# Patient Record
Sex: FEMALE
[De-identification: names, ages, dates, MRNs, and addresses within clinical notes are randomized per-mention and may not be internally consistent; named-entity substitution may affect disease eponyms.]

---

## 2022-06-20 ENCOUNTER — HOSPITAL ENCOUNTER (OUTPATIENT)
Dept: HOSPITAL 5 - ED | Age: 87
Setting detail: OBSERVATION
LOS: 1 days | Discharge: HOME | End: 2022-06-21
Attending: STUDENT IN AN ORGANIZED HEALTH CARE EDUCATION/TRAINING PROGRAM | Admitting: INTERNAL MEDICINE
Payer: MEDICARE

## 2022-06-20 DIAGNOSIS — Z79.4: ICD-10-CM

## 2022-06-20 DIAGNOSIS — E78.2: ICD-10-CM

## 2022-06-20 DIAGNOSIS — F01.50: ICD-10-CM

## 2022-06-20 DIAGNOSIS — Z79.899: ICD-10-CM

## 2022-06-20 DIAGNOSIS — I48.11: ICD-10-CM

## 2022-06-20 DIAGNOSIS — I67.2: ICD-10-CM

## 2022-06-20 DIAGNOSIS — Z90.710: ICD-10-CM

## 2022-06-20 DIAGNOSIS — E66.2: ICD-10-CM

## 2022-06-20 DIAGNOSIS — Z95.1: ICD-10-CM

## 2022-06-20 DIAGNOSIS — Z86.73: ICD-10-CM

## 2022-06-20 DIAGNOSIS — I11.0: ICD-10-CM

## 2022-06-20 DIAGNOSIS — Z87.891: ICD-10-CM

## 2022-06-20 DIAGNOSIS — Z79.82: ICD-10-CM

## 2022-06-20 DIAGNOSIS — Z98.890: ICD-10-CM

## 2022-06-20 DIAGNOSIS — I50.33: ICD-10-CM

## 2022-06-20 DIAGNOSIS — E11.9: ICD-10-CM

## 2022-06-20 DIAGNOSIS — Z79.84: ICD-10-CM

## 2022-06-20 DIAGNOSIS — E88.81: ICD-10-CM

## 2022-06-20 DIAGNOSIS — I20.0: Primary | ICD-10-CM

## 2022-06-20 LAB
APTT BLD: 31 SEC. (ref 24.2–36.6)
APTT BLD: 49.5 SEC. (ref 24.2–36.6)
BASOPHILS # (AUTO): 0 K/MM3 (ref 0–0.1)
BASOPHILS NFR BLD AUTO: 0.5 % (ref 0–1.8)
BUN SERPL-MCNC: 13 MG/DL (ref 7–17)
BUN/CREAT SERPL: 16 %
CALCIUM SERPL-MCNC: 9.5 MG/DL (ref 8.4–10.2)
EOSINOPHIL # BLD AUTO: 0.2 K/MM3 (ref 0–0.4)
EOSINOPHIL NFR BLD AUTO: 2.6 % (ref 0–4.3)
HCT VFR BLD CALC: 42.4 % (ref 30.3–42.9)
HCT VFR BLD CALC: 48.2 % (ref 30.3–42.9)
HEMOLYSIS INDEX: 21
HGB BLD-MCNC: 14.1 GM/DL (ref 10.1–14.3)
HGB BLD-MCNC: 15.5 GM/DL (ref 10.1–14.3)
INR PPP: 1.01 (ref 0.87–1.13)
INR PPP: 1.41 (ref 0.87–1.13)
LYMPHOCYTES # BLD AUTO: 1.3 K/MM3 (ref 1.2–5.4)
LYMPHOCYTES NFR BLD AUTO: 21.4 % (ref 13.4–35)
MCHC RBC AUTO-ENTMCNC: 32 % (ref 30–34)
MCHC RBC AUTO-ENTMCNC: 33 % (ref 30–34)
MCV RBC AUTO: 88 FL (ref 79–97)
MCV RBC AUTO: 90 FL (ref 79–97)
MONOCYTES # (AUTO): 0.5 K/MM3 (ref 0–0.8)
MONOCYTES % (AUTO): 8.5 % (ref 0–7.3)
PLATELET # BLD: 198 K/MM3 (ref 140–440)
PLATELET # BLD: 199 K/MM3 (ref 140–440)
RBC # BLD AUTO: 4.83 M/MM3 (ref 3.65–5.03)
RBC # BLD AUTO: 5.38 M/MM3 (ref 3.65–5.03)
T4 FREE SERPL-MCNC: 1.4 NG/DL (ref 0.76–1.46)

## 2022-06-20 PROCEDURE — 96374 THER/PROPH/DIAG INJ IV PUSH: CPT

## 2022-06-20 PROCEDURE — 85379 FIBRIN DEGRADATION QUANT: CPT

## 2022-06-20 PROCEDURE — 82550 ASSAY OF CK (CPK): CPT

## 2022-06-20 PROCEDURE — 82805 BLOOD GASES W/O2 SATURATION: CPT

## 2022-06-20 PROCEDURE — 71045 X-RAY EXAM CHEST 1 VIEW: CPT

## 2022-06-20 PROCEDURE — 85027 COMPLETE CBC AUTOMATED: CPT

## 2022-06-20 PROCEDURE — 84443 ASSAY THYROID STIM HORMONE: CPT

## 2022-06-20 PROCEDURE — 84484 ASSAY OF TROPONIN QUANT: CPT

## 2022-06-20 PROCEDURE — 96376 TX/PRO/DX INJ SAME DRUG ADON: CPT

## 2022-06-20 PROCEDURE — 83735 ASSAY OF MAGNESIUM: CPT

## 2022-06-20 PROCEDURE — G0378 HOSPITAL OBSERVATION PER HR: HCPCS

## 2022-06-20 PROCEDURE — 80048 BASIC METABOLIC PNL TOTAL CA: CPT

## 2022-06-20 PROCEDURE — 93005 ELECTROCARDIOGRAM TRACING: CPT

## 2022-06-20 PROCEDURE — 83880 ASSAY OF NATRIURETIC PEPTIDE: CPT

## 2022-06-20 PROCEDURE — 82553 CREATINE MB FRACTION: CPT

## 2022-06-20 PROCEDURE — 85610 PROTHROMBIN TIME: CPT

## 2022-06-20 PROCEDURE — 85025 COMPLETE CBC W/AUTO DIFF WBC: CPT

## 2022-06-20 PROCEDURE — 84439 ASSAY OF FREE THYROXINE: CPT

## 2022-06-20 PROCEDURE — 82565 ASSAY OF CREATININE: CPT

## 2022-06-20 PROCEDURE — 36415 COLL VENOUS BLD VENIPUNCTURE: CPT

## 2022-06-20 PROCEDURE — 99285 EMERGENCY DEPT VISIT HI MDM: CPT

## 2022-06-20 PROCEDURE — 85730 THROMBOPLASTIN TIME PARTIAL: CPT

## 2022-06-20 PROCEDURE — 93970 EXTREMITY STUDY: CPT

## 2022-06-20 RX ADMIN — METOPROLOL TARTRATE SCH MG: 50 TABLET, FILM COATED ORAL at 22:57

## 2022-06-20 RX ADMIN — FAMOTIDINE SCH MG: 10 TABLET ORAL at 22:57

## 2022-06-20 RX ADMIN — FUROSEMIDE SCH MG: 10 INJECTION, SOLUTION INTRAVENOUS at 22:57

## 2022-06-20 RX ADMIN — APIXABAN SCH MG: 5 TABLET, FILM COATED ORAL at 22:57

## 2022-06-20 RX ADMIN — FUROSEMIDE SCH MG: 10 INJECTION, SOLUTION INTRAVENOUS at 18:14

## 2022-06-20 RX ADMIN — Medication SCH ML: at 23:01

## 2022-06-20 RX ADMIN — FUROSEMIDE ONE MG: 10 INJECTION, SOLUTION INTRAVENOUS at 13:48

## 2022-06-20 NOTE — HISTORY AND PHYSICAL REPORT
History of Present Illness


Chief complaint: 





My chest hurts and it is hard to breathe


History of present illness: 


85 YO Female with CAD S/P Stent Placement, Atrial Fib on Therapeutic 

Anticoagulation with Eliquis, HTN, HLD, DM, CVA, Obesity Hypoventilation 

Syndrome, Metabolic Syndrome, Vascular Dementia, Cerebral Atherosclerosis 

presents to ED for evaluation.  Patient reports "my chest hurts and is hard to 

breathe".  Patient states that she experienced a sudden onset of chest pain and 

shortness of breath that began at 0400 hrs. that awoke her from sleep.  Patient 

states that that the chest pain and shortness of breath was initially 

intermittent but has become more constant.  Patient states that pain is 3-5/10, 

intermittent, substernal, worsened with exertion, relieved with rest.  Patient 

acknowledges decreased exercise tolerance, dyspnea on exertion, dyspnea at rest,

orthopnea, lower extremity edema, as well as paroxysmal nocturnal dyspnea.  

Patient acknowledges 8 pound weight gain over the past 2 weeks.  EMS was 

notified and upon arrival the patient was found to be in distress and 

subsequently transported to [facility] for further care and evaluation of the 

aforementioned symptoms.  The patient was seen and evaluated in the emergency 

department.  All lab and imaging studies reviewed.  Patient found to have 

angina, as well as clinical symptoms consistent with new onset CHF.  Patient 

admitted to telemetry and initiated on ACS protocol as well as CHF protocol.  

Patient denies fever, chills, palpitation, productive cough, skin rash, recent 

contact, known exposure to COVID-19.  Prior admission on 1/4/2018 reviewed.  All

medication listed at time of admission has been reconciled.  Advanced care 

planning conducted in ED.  Cardiology team consulted in ED.





Past History


Past Medical History: atrial fib, CAD, hypertension, hyperlipidemia, stroke


Past Surgical History: hysterectomy, Other (Knee surgery, back surgery, facial)


Social history: , smoking (Former smoker)


Family history: CAD





Medications and Allergies


                                    Allergies











Allergy/AdvReac Type Severity Reaction Status Date / Time


 


codeine Allergy  Rash Verified 06/20/22 11:03


 


ibuprofen [From Motrin] Allergy  Rash Verified 06/20/22 11:03


 


lisinopril Allergy  Rash Verified 06/20/22 11:03


 


meperidine HCl [From Demerol] Allergy  Rash Verified 06/20/22 11:03


 


oxycodone HCl [From Percodan] Allergy  Rash Verified 06/20/22 11:03


 


oxycodone terephthalate Allergy  Rash Verified 06/20/22 11:03





[From Percodan]     


 


Penicillins Allergy  Rash Verified 06/20/22 11:03


 


prednisone Allergy  Rash Verified 06/20/22 11:03


 


acetaminophen AdvReac  Rash Verified 06/20/22 11:03





[From Darvocet-N]     


 


diazepam [From Valium] AdvReac  Rash Verified 06/20/22 11:03


 


propoxyphene napsylate AdvReac  Rash Verified 06/20/22 11:03





[From Darvocet-N]     


 


thread Allergy  Unknown Uncoded 06/20/22 11:03











                                Home Medications











 Medication  Instructions  Recorded  Confirmed  Last Taken  Type


 


ISOSORBIDE MONOnitrate [Imdur ER] 60 mg PO QDAY 01/04/18 01/04/18 01/03/18 

History


 


Insulin NPH/Regular [NovoLIN 70/30] 12 unit SUB-Q BIDDIAB  units 01/07/18  

Unknown Rx


 


Apixaban [Eliquis] 5 mg PO BID 06/20/22 06/20/22 06/19/22 History


 


Humalog 25 unit SUB-Q DAILY 06/20/22 06/20/22 Unknown History


 


Lasix 40 mg PO DAILY 06/20/22 06/20/22 06/19/22 History


 


Metoprolol 1,000 mg PO BID 06/20/22 06/20/22 06/19/22 History


 


metFORMIN 1,000 mg PO BID 06/20/22 06/20/22 06/19/22 History











Active Meds: 


Active Medications





Acetaminophen (Acetaminophen 325 Mg Tab)  650 mg PO Q4H PRN


   PRN Reason: Pain MILD(1-3)/Fever >100.5/HA


Albuterol (Albuterol 2.5 Mg/3 Ml Nebu)  2.5 mg IH Q4HRT PRN


   PRN Reason: Shortness Of Breath


Apixaban (Apixaban 5 Mg Tab)  5 mg PO Q12HR CINTHIA; Protocol


Aspirin (Aspirin 81 Mg Tab Chew)  81 mg PO DAILY CINTHIA


Atorvastatin Calcium (Atorvastatin 10 Mg Tab)  10 mg PO QHS CINTHIA


Famotidine (Famotidine 10 Mg Tab)  10 mg PO BID CINTHIA


Furosemide (Furosemide 40 Mg/4 Ml Inj)  40 mg IV BID CINTHIA


Metoprolol Tartrate (Metoprolol Tartrate 50 Mg Tab)  12.5 mg PO BID CINTHIA


Morphine Sulfate (Morphine 4 Mg/1 Ml Inj)  2 mg IV Q8H PRN


   PRN Reason: Pain , Severe (7-10)


Ondansetron HCl (Ondansetron 4 Mg/2 Ml Inj)  4 mg IV Q8H PRN


   PRN Reason: Nausea And Vomiting


Oxycodone/Acetaminophen (Oxycodone /Acetaminophen 5-325mg Tab)  1 tab PO Q6H PRN


   PRN Reason: Pain, Moderate (4-6)


Sodium Chloride (Sodium Chloride 0.9% 10 Ml Flush Syringe)  10 ml IV BID CINTHIA


Sodium Chloride (Sodium Chloride 0.9% 10 Ml Flush Syringe)  10 ml IV PRN PRN


   PRN Reason: LINE FLUSH











Review of Systems


Constitutional: weight gain, no weight loss, no fever, no chills


Ears, nose, mouth and throat: no ear pain, no tinnitis, no decreased hearing, no

 nasal congestion


Breasts: no change in shape, no swelling, no mass


Cardiovascular: chest pain, orthopnea, shortness of breath, dyspnea on exertion,

 paroxysmal nocturnal dyspnea, high blood pressure, leg edema, decreased 

exercise tolerance


Respiratory: no cough, no cough with sputum, no hemoptysis


Gastrointestinal: nausea, no abdominal pain, no vomiting, no diarrhea, no 

constipation


Genitourinary Female: no pelvic pain, no flank pain, no dysuria, no urinary 

frequency, no urgency


Rectal: no pain, no incontinence, no bleeding


Musculoskeletal: no neck stiffness, no neck pain, no shooting arm pain


Integumentary: no rash, no pruritis, no redness, no sores, no wounds


Neurological: no head injury, no transient paralysis, no weakness, no 

parathesias, no tingling, no syncope


Psychiatric: no anxiety, no memory loss, no insomnia, no hypersomnia


Endocrine: no cold intolerance, no polyphagia, no excessive thirst, no recent 

glucocorticoid use


Hematologic/Lymphatic: no easy bruising, no easy bleeding, no lymphadenopathy


Allergic/Immunologic: no urticaria, no allergic rhinitis, no persistent 

infections





Exam





- Constitutional


Vitals: 


                                        











Temp Pulse Resp BP Pulse Ox


 


 97.6 F   78   16   153/75   100 


 


 06/20/22 11:26  06/20/22 16:41  06/20/22 16:41  06/20/22 16:41  06/20/22 16:41











General appearance: Present: mild distress, obese





- EENT


Eyes: Present: PERRL


ENT: hearing intact, clear oral mucosa





- Neck


Neck: Present: supple, normal ROM, masses or JVD





- Respiratory


Respiratory effort: normal


Respiratory: bilateral: diminished, rales





- Cardiovascular


Rhythm: irregularly irregular


Heart Sounds: Present: S1 & S2.  Absent: rub, click





- Extremities


Extremities: pulses symmetrical


Extremity abnormal: edema


Peripheral Pulses: within normal limits





- Abdominal


General gastrointestinal: Present: soft, non-tender, non-distended, normal bowel

 sounds


Female genitourinary: Present: normal





- Integumentary


Integumentary: Present: clear, warm, dry





- Musculoskeletal


Musculoskeletal: gait normal, strength equal bilaterally





- Psychiatric


Psychiatric: appropriate mood/affect, intact judgment & insight





- Neurologic


Neurologic: CNII-XII intact, moves all extremities





HEART Score





- HEART Score


EKG: Non-specific


Age: > 65


Risk factors: > 3 risk factors or hx of atherosclerotic disease


Troponin: 


                                        











Troponin T  < 0.010 ng/mL (0.00-0.029)   06/20/22  12:46    











Troponin: < normal limit





Results





- Labs


CBC & Chem 7: 


                                 06/20/22 17:08





                                 06/20/22 17:08


Labs: 


                              Abnormal lab results











  06/20/22 06/20/22 06/20/22 Range/Units





  12:46 12:46 12:46 


 


Mono % (Auto)  8.5 H    (0.0-7.3)  %


 


PT   18.9 H   (12.2-14.9)  Sec.


 


INR   1.41 H   (0.87-1.13)  


 


APTT   49.5 H   (24.2-36.6)  Sec.


 


VBG pH    7.317 L  (7.320-7.420)  














Assessment and Plan





- Patient Problems


(1) Angina at rest


Current Visit: Yes   Status: Acute   


Plan to address problem: 


ACS protocol: Serial cardiac enzymes, EKG, telemetry monitoring, cardiology team

 consulted in ED, further care and testing as per cardiology team.  Morphine, 

submental oxygen, nitro, aspirin.








(2) CHF (congestive heart failure)


Current Visit: Yes   Status: Acute   


Qualifiers: 


   Heart failure type: systolic   Heart failure chronicity: acute   Qualified 

Code(s): I50.21 - Acute systolic (congestive) heart failure   


Plan to address problem: 


CHF protocol: Strict I's/O, monitor urine output every shift, daily weight, 

afterload reduction, diuresis with Lasix, blood pressure control, echocardiogram

 ordered and pending at time of admission, thyroid panel, magnesium level.  

Cardiology team consulted.








(3) Hypertension


Current Visit: Yes   Status: Acute   


Qualifiers: 


   Hypertension type: primary hypertension   Qualified Code(s): I10 - Essential 

(primary) hypertension   


Plan to address problem: 


Monitor blood pressure every shift, continue medical management.








(4) Hyperlipidemia


Current Visit: Yes   Status: Acute   


Qualifiers: 


   Hyperlipidemia type: mixed hyperlipidemia   Qualified Code(s): E78.2 - Mixed 

hyperlipidemia   


Plan to address problem: 


Low-cholesterol diet, statin therapy, supportive care.








(5) A-fib


Current Visit: Yes   Status: Acute   


Qualifiers: 


   Atrial fibrillation type: longstanding persistent   Qualified Code(s): I48.11

 - Longstanding persistent atrial fibrillation   


Plan to address problem: 


Rate controlled, continue therapeutic anticoagulation, thyroid panel.








(6) Obesity hypoventilation syndrome


Current Visit: Yes   Status: Acute   


Plan to address problem: 


Balanced diet, increase physical activity discharge, outpatient pulmonary 

follow-up for sleep study.








(7) Metabolic syndrome


Current Visit: Yes   Status: Acute   


Plan to address problem: 


Balanced diet, increase physical activity discharge, weight reduction, statin 

therapy as clinically indicated.








(8) Diabetes


Current Visit: Yes   Status: Acute   


Plan to address problem: 


Consistent carbohydrate diet, Accu-Chek, insulin protocol, hypoglycemia 

protocol.








(9) Vascular dementia


Current Visit: Yes   Status: Acute   


Qualifiers: 


   Dementia behavioral disturbance: without behavioral disturbance   Qualified 

Code(s): F01.50 - Vascular dementia without behavioral disturbance   


Plan to address problem: 


We will planning however redirection, benzodiazepine therapy as clinically 

indicated, supportive care.








(10) Cerebral atherosclerosis


Current Visit: Yes   Status: Acute   


Plan to address problem: 


For prompt, verbal redirection, antiplatelet therapy as clinically indicated.








(11) DVT prophylaxis


Current Visit: Yes   Status: Acute   


Plan to address problem: 


SCD to bilateral lower extremities while in bed, continue therapeutic 

anticoagulation.








(12) Advance care planning


Current Visit: Yes   Status: Acute   


Plan to address problem: 


Disease education conducted, care plan discussed, diagnosis discussed, prognosis

 discussed, patient is full code.  Patient knowledges understanding and 

agreement with care plan, +30 minutes.








(13) Preventative health care


Current Visit: Yes   Status: Acute   


Plan to address problem: 


Patient counseled regarding risk factor reduction, balanced diet, weight red

uction, home safety precautions, patient counseled regarding outpatient follow-

up with primary care physician regarding all age and risk factor appropriate 

screening test.  +30 minutes.

## 2022-06-20 NOTE — VASCULAR LAB REPORT
DUPLEX DOPPLER LOWER EXTREMITY VEINS, BILATERAL



INDICATION / CLINICAL INFORMATION:

Rule out DVT. Chest pain.



TECHNIQUE:

Duplex doppler imaging was performed through the veins of both lower extremities using venous kiko
ermias and other maneuvers.



COMPARISON: 

None available.



FINDINGS:

RIGHT COMMON FEMORAL VEIN: Negative.

RIGHT FEMORAL VEIN: Negative.

RIGHT POPLITEAL VEIN: Negative.

RIGHT CALF VEINS: Negative.



LEFT COMMON FEMORAL VEIN: Negative.

LEFT FEMORAL VEIN: Negative.

LEFT POPLITEAL VEIN: Negative.

LEFT CALF VEINS: Negative.



ADDITIONAL FINDINGS: None.



IMPRESSION:

1. No sonographic evidence for DVT in either lower extremity.



Signer Name: Cory Rothman MD 

Signed: 6/20/2022 5:00 PM

Workstation Name: [id number]

## 2022-06-20 NOTE — XRAY REPORT
XR chest 1V ap



INDICATION / CLINICAL INFORMATION: chest pain.



COMPARISON: None available.



FINDINGS:



SUPPORT DEVICES: None.

HEART /PULMONARY VASCULATURE: Cardiac enlargement with pulmonary vasculature congestion. 

LUNGS / PLEURA: Mild increased interstitial markings in the lung bases. No focal airspace consolidati
on. No sizable pleural effusion or pneumothorax. 



ADDITIONAL FINDINGS: No significant additional findings.



IMPRESSION:

1. Findings indicative of CHF/volume overload with mild interstitial edema.



Signer Name: Owen Carias MD 

Signed: 6/20/2022 1:36 PM

Workstation Name: [id number]

## 2022-06-20 NOTE — CONSULTATION
History of Present Illness


Consult date: 06/20/22


Requesting physician: ISAEL JIN


Consult reason: chest pain


History of present illness: 





Patient is a 86-year-old female with a reported past medical history of coronary

artery disease s/p PCI(reports 10 stents) and prior MI,afib on Eliquis as an 

outpatient, hypertension, TIA, hyperlipidemia, diabetes, who presents to the ED 

today with a complaint of shortness of breath which started at 4 AM this billynin

g.  Patient reports that earlier this morning she woke up from sleep with 

difficulty breathing.  Patient reports that she has been having intermittent 

shortness of breath since January the last time she saw her cardiologist.  She 

also states since then she has been having lower extremity edema.  She reports 

had some chest tightness which she also described as sharp and worse with deep 

breathing and palpation.  She states she also felt slightly nauseous this 

morning.  Patient was transported to the ED for further evaluation.  CXR showed 

CHF/volume overload.  Patient given Lasix while in the ED and reports 

improvement in her symptoms.  Patient states that when she last saw her primary 

cardiologist he reported possible CHF patient reports shortness of breath, 

dyspnea on exertion, chronic orthopnea, lower extremity edema, nausea.  Patient 

denies squeezing or crushing chest pain, diaphoresis, vomiting, and patient 

reports this does not feel like any prior MI that she has had.  Patient follows 

with Dr. Zac Simon at [facility] however was previously seen by our practice 

during admission in 2018.  Cardiology was consulted for chest pain.





Past History


Past Medical History: atrial fib, CAD, hypertension, hyperlipidemia, stroke


Past Surgical History: hysterectomy, Other (Knee surgery, back surgery, facial)


Social history: smoking (Former smoker)


Family history: CAD





Medications and Allergies


                                    Allergies











Allergy/AdvReac Type Severity Reaction Status Date / Time


 


codeine Allergy  Rash Verified 06/20/22 11:03


 


ibuprofen [From Motrin] Allergy  Rash Verified 06/20/22 11:03


 


lisinopril Allergy  Rash Verified 06/20/22 11:03


 


meperidine HCl [From Demerol] Allergy  Rash Verified 06/20/22 11:03


 


oxycodone HCl [From Percodan] Allergy  Rash Verified 06/20/22 11:03


 


oxycodone terephthalate Allergy  Rash Verified 06/20/22 11:03





[From Percodan]     


 


Penicillins Allergy  Rash Verified 06/20/22 11:03


 


prednisone Allergy  Rash Verified 06/20/22 11:03


 


acetaminophen AdvReac  Rash Verified 06/20/22 11:03





[From Darvocet-N]     


 


diazepam [From Valium] AdvReac  Rash Verified 06/20/22 11:03


 


propoxyphene napsylate AdvReac  Rash Verified 06/20/22 11:03





[From Darvocet-N]     


 


thread Allergy  Unknown Uncoded 06/20/22 11:03











                                Home Medications











 Medication  Instructions  Recorded  Confirmed  Last Taken  Type


 


ISOSORBIDE MONOnitrate [Imdur ER] 60 mg PO QDAY 01/04/18 01/04/18 01/03/18 

History


 


Insulin NPH/Regular [NovoLIN 70/30] 12 unit SUB-Q BIDDIAB  units 01/07/18  

Unknown Rx


 


Apixaban [Eliquis] 5 mg PO BID 06/20/22 06/20/22 06/19/22 History


 


Humalog 25 unit SUB-Q DAILY 06/20/22 06/20/22 Unknown History


 


Lasix 40 mg PO DAILY 06/20/22 06/20/22 06/19/22 History


 


Metoprolol 1,000 mg PO BID 06/20/22 06/20/22 06/19/22 History


 


metFORMIN 1,000 mg PO BID 06/20/22 06/20/22 06/19/22 History











Active Meds: 


Active Medications





Apixaban (Apixaban 5 Mg Tab)  5 mg PO Q12HR CINTHIA; Protocol


Aspirin (Aspirin 81 Mg Tab Chew)  81 mg PO DAILY CINTHIA


Atorvastatin Calcium (Atorvastatin 10 Mg Tab)  10 mg PO QHS CINTHIA


Furosemide (Furosemide 40 Mg/4 Ml Inj)  40 mg IV BID CINTHIA


Metoprolol Tartrate (Metoprolol Tartrate 50 Mg Tab)  12.5 mg PO BID [facility]











Review of Systems


Constitutional: no weight loss, no weight gain, no fever


Ears, nose, mouth and throat: no sinus pressure, no sinus pain


Cardiovascular: orthopnea (Chronic), shortness of breath, dyspnea on exertion, 

paroxysmal nocturnal dyspnea, leg edema


Respiratory: shortness of breath, dyspnea on exertion


Gastrointestinal: nausea, no abdominal pain, no vomiting


Musculoskeletal: no neck stiffness, no neck pain, no shooting arm pain


Integumentary: no rash, no pruritis, no redness


Neurological: no head injury, no transient paralysis


Psychiatric: no anxiety, no memory loss


Endocrine: no cold intolerance, no heat intolerance


Hematologic/Lymphatic: no easy bruising, no easy bleeding





Physical Examination


                                   Vital Signs











Temp Pulse Resp BP Pulse Ox


 


 97.6 F   63   16   160/65   97 


 


 06/20/22 11:26 06/20/22 11:26 06/20/22 11:26 06/20/22 11:26 06/20/22 11:26











General appearance: no acute distress


HEENT: Positive: PERRL


Cardiac: Positive: irregularly irregular


Lungs: Positive: Decreased Breath Sounds


Neuro: Positive: Grossly Intact


Abdomen: Positive: Soft


Skin: Negative: Rash, Suspicious Lesions, Ulceration


Extremities: Present: edema (Right lower extremity greater than left lower 

extremity)





Results





                                 06/20/22 12:46





                                 06/20/22 12:46


                                 Cardiac Enzymes











  06/20/22 Range/Units





  12:46 


 


CK-MB (CK-2)  1.5  (0.0-4.0)  ng/mL








                                   Coagulation











  06/20/22 Range/Units





  12:46 


 


PT  18.9 H  (12.2-14.9)  Sec.


 


INR  1.41 H  (0.87-1.13)  


 


APTT  49.5 H  (24.2-36.6)  Sec.








                                       CBC











  06/20/22 Range/Units





  12:46 


 


WBC  6.2  (4.5-11.0)  K/mm3


 


RBC  4.83  (3.65-5.03)  M/mm3


 


Hgb  14.1  (10.1-14.3)  gm/dl


 


Hct  42.4  (30.3-42.9)  %


 


Plt Count  199  (140-440)  K/mm3


 


Lymph # (Auto)  1.3  (1.2-5.4)  K/mm3


 


Mono # (Auto)  0.5  (0.0-0.8)  K/mm3


 


Eos # (Auto)  0.2  (0.0-0.4)  K/mm3


 


Baso # (Auto)  0.0  (0.0-0.1)  K/mm3








                          Comprehensive Metabolic Panel











  06/20/22 Range/Units





  12:46 


 


Sodium  138  (137-145)  mmol/L


 


Potassium  4.4  (3.6-5.0)  mmol/L


 


Chloride  102.1  ()  mmol/L


 


Carbon Dioxide  26  (22-30)  mmol/L


 


BUN  13  (7-17)  mg/dL


 


Creatinine  0.8  (0.6-1.2)  mg/dL


 


Glucose  86  ()  mg/dL


 


Calcium  9.5  (8.4-10.2)  mg/dL














- Imaging and Cardiology


Echo: report reviewed


EKG: report reviewed, image reviewed





EKG interpretations





- Telemetry


EKG Rhythm: Atrial Fibrillation





- EKG


Supraventricular dysrhythmia: atrial fibrillation


AV and intraventricular conduction: 1 AV block


Repolarization changes or abnormalities: nonspecific abnormality, ST segment, 

and/or T wave





Assessment and Plan





Patient is a 86-year-old female with a reported past medical history of coronary

 artery disease s/p PCI(reports 10 stents) and prior MI,afib on Eliquis as an 

outpatient, hypertension, TIA, hyperlipidemia, diabetes, who presents to the ED 

today with a complaint of shortness of breath which started at 4 AM this 

morning.


HFpEF


Coronary artery disease s/p PCI


A. fib


Hypertension


Hyperlipidemia


History of TIA


Diabetes





Echo 1/14/2022 Mildly increased left ventricular wall thickness.Left ventricular

 ejection fraction is 60%. Aortic valve is tricuspid, thickened, focally 

calcified and non-restricted. Mildly dilated right atrium. Trace mitral valve 

regurgitation. Mild pulmonic valve insufficiency. Trivial pericardial effusion. 

Low normal right ventricular systolic function. Mildly enlarged right 

ventricular cavity size. Dilated inferior vena cava, with respiratory size 

variation less than 50%. The estimated right ventricular systolic pressure is 

moderately elevated right ventricular systolic pressure at 52.4 mmHg.


PET Stress test 1/20/2020 Abnormal cardiac PET with a partially reversible 

perfusion defect along the distal anterior septal wall. This is approximately 

11% of the myocardium at rest and increases to perhaps 15% at peak stress 

suggesting farhat-infarct ischemia. There is a separate completely reversible 

perfusion defect along the lateral wall consistent with myocardial ischemia. 

This area represents 20% of the myocardium. Compared to the prior study in 2017 

the farhat-infarct ischemia and the lateral wall ischemia are new findings.


Outpatient medications: Eliquis, Lasix 40 mg p.o. daily, metoprolol succinate 50

 mg p.o. daily, rosuvastatin 5 mg p.o. nightly





Plan:


EKG shows A. fib 68 first-degree block.  No acute ischemic changes.  Troponins 

negative x1.  Repeat troponins pending.  Patient currently chest pain-free


Patient reports shortness of breath, orthopnea, PND, and CXR shows volume 

overload agree with Lasix 40 mg IV twice daily for diuresis.  Strict I&O's, with

 close monitoring renal function.  BMP in a.m.


BNP pending


Resume outpatient Eliquis for anticoagulation


Patient on rosuvastatin 5 mg p.o. nightly as an outpatient however rosuvastatin 

not on formulary will initiate atorvastatin 10mg PO QHS


Initiate aspirin


Patient is first-degree AV block will initiate metoprolol 12.5 mg p.o. twice 

daily


No ACE or ARB due to patient having documented allergy for lisinopril


Patient for stress test in the AM.  N.p.o. after midnight


Patient right lower extremity has more edema than left lower extremity we will 

check D-dimer and get venous Doppler study








Patient in conjunction with Dr. Floyd who agrees with plan of care





- Patient Problems


(1) A-fib


Current Visit: Yes   Status: Acute   





(2) CHF exacerbation


Current Visit: Yes   Status: Acute   





(3) Chest pain


Current Visit: Yes   Status: Acute   





(4) H/O heart artery stent


Current Visit: No   Status: Acute   





(5) Hyperlipidemia


Current Visit: No   Status: Acute   





(6) Hypertension


Current Visit: No   Status: Acute   





(7) CAD (coronary artery disease)


Current Visit: No   Status: Chronic   


Qualifiers:

## 2022-06-21 VITALS — SYSTOLIC BLOOD PRESSURE: 143 MMHG | DIASTOLIC BLOOD PRESSURE: 62 MMHG

## 2022-06-21 LAB
BUN SERPL-MCNC: 17 MG/DL (ref 7–17)
BUN/CREAT SERPL: 17 %
CALCIUM SERPL-MCNC: 9.5 MG/DL (ref 8.4–10.2)
HEMOLYSIS INDEX: 18

## 2022-06-21 RX ADMIN — METOPROLOL TARTRATE SCH MG: 50 TABLET, FILM COATED ORAL at 09:16

## 2022-06-21 RX ADMIN — FUROSEMIDE SCH MG: 10 INJECTION, SOLUTION INTRAVENOUS at 09:21

## 2022-06-21 RX ADMIN — APIXABAN SCH MG: 5 TABLET, FILM COATED ORAL at 09:11

## 2022-06-21 RX ADMIN — Medication SCH ML: at 09:21

## 2022-06-21 RX ADMIN — FAMOTIDINE SCH MG: 10 TABLET ORAL at 09:11

## 2022-06-21 NOTE — PROGRESS NOTE
Assessment and Plan





Patient is a 86-year-old female with a reported past medical history of coronary

artery disease s/p PCI(reports 10 stents) and prior MI,afib on Eliquis as an 

outpatient, hypertension, TIA, hyperlipidemia, diabetes, who presents to the ED 

today with a complaint of shortness of breath which started at 4 AM this 

morning.


HFpEF


Coronary artery disease s/p PCI


A. fib


Hypertension


Hyperlipidemia


History of TIA


Diabetes





Echo 1/14/2022 Mildly increased left ventricular wall thickness.Left ventricular

ejection fraction is 60%. Aortic valve is tricuspid, thickened, focally 

calcified and non-restricted. Mildly dilated right atrium. Trace mitral valve 

regurgitation. Mild pulmonic valve insufficiency. Trivial pericardial effusion. 

Low normal right ventricular systolic function. Mildly enlarged right 

ventricular cavity size. Dilated inferior vena cava, with respiratory size 

variation less than 50%. The estimated right ventricular systolic pressure is 

moderately elevated right ventricular systolic pressure at 52.4 mmHg.


PET Stress test 1/20/2020 Abnormal cardiac PET with a partially reversible 

perfusion defect along the distal anterior septal wall. This is approximately 

11% of the myocardium at rest and increases to perhaps 15% at peak stress 

suggesting farhat-infarct ischemia. There is a separate completely reversible 

perfusion defect along the lateral wall consistent with myocardial ischemia. 

This area represents 20% of the myocardium. Compared to the prior study in 2017 

the farhat-infarct ischemia and the lateral wall ischemia are new findings.


Outpatient medications: Eliquis, Lasix 40 mg p.o. daily, metoprolol succinate 50

mg p.o. daily, rosuvastatin 5 mg p.o. nightly





Plan:


EKG shows sinus 68 first-degree block.  No acute ischemic changes.  Troponins 

negative x2. AMI ruled out.  Patient currently chest pain-free


Patient reports significant improvement this a.m. patient appears  euvolemic on 

exam.  Stop diuresis with Lasix 40 mg IV and convert to Lasix 40 mg p.o. daily


Continue Eliquis for anticoagulation


Continue aspirin and statin


Telemetry reviewed patient in A. fib rate trending 90s to low 100s.  Metoprolol 

12.5 mg p.o. twice daily and convert to outpatient metoprolol succinate 50 mg 

p.o. daily


No ACE or ARB due to patient having documented allergy for lisinopril


Patient refused stress test this a.m. patient had negative troponinsx2, no acute

ischemic changes on EKG, and currently chest pain-free.  Will defer to primary 

cardiologist for outpatient ischemic eval 


D-dimer negative and Doppler studies negative for DVT


Plan of care discussed with patient who verbalized understanding and 

acknowledgment


Cardiac status otherwise stable for discharge








Patient should follow with her primary cardiologist in 1 to 2 weeks after 

discharge


Patient in conjunction with Dr. Floyd who agrees with plan of care





- Patient Problems


(1) A-fib


Current Visit: Yes   Status: Acute   


Qualifiers: 


   Atrial fibrillation type: longstanding persistent   Qualified Code(s): I48.11

- Longstanding persistent atrial fibrillation   





(2) Chest pain


Current Visit: Yes   Status: Acute   





(3) H/O heart artery stent


Current Visit: No   Status: Acute   





(4) Hyperlipidemia


Current Visit: No   Status: Acute   





(5) Hypertension


Current Visit: No   Status: Acute   





(6) CAD (coronary artery disease)


Current Visit: No   Status: Chronic   


Qualifiers: 


 





(7) Acute on chronic diastolic (congestive) heart failure


Current Visit: Yes   Status: Acute   





Subjective


Date of service: 06/21/22


Principal diagnosis: Chest pain, acute on chronic diastolic dysfunction


Interval history: 





Patient resting in bed in no acute distress.  Patient reports significant 

improvement in her symptoms.  Patient refused stress test this a.m.


A. fib 90s to 100s





Objective


                                   Vital Signs











  Temp Pulse Resp BP BP Pulse Ox


 


 06/21/22 09:25       95


 


 06/21/22 09:20   82   143/62  


 


 06/21/22 09:16   82   143/62  


 


 06/21/22 09:13  97.8 F  82  18   143/62  99


 


 06/21/22 05:33   80    


 


 06/21/22 03:58  97.7 F  83  18  136/53   94


 


 06/21/22 02:10     176/80   96


 


 06/21/22 00:34       95


 


 06/20/22 22:57   77   162/70  


 


 06/20/22 22:20     176/80   89


 


 06/20/22 22:10     176/80   81 L


 


 06/20/22 22:00  97.9 F  88  18  176/80  162/70  82 L


 


 06/20/22 21:52     176/80   86


 


 06/20/22 21:40     176/80   91


 


 06/20/22 21:34       0 L


 


 06/20/22 21:33   86    


 


 06/20/22 21:30     176/80   87


 


 06/20/22 21:20     176/80   87


 


 06/20/22 21:10     176/80   87


 


 06/20/22 21:00     176/80   87


 


 06/20/22 20:50     176/80   88


 


 06/20/22 20:40     176/80   90


 


 06/20/22 20:30     176/80   88


 


 06/20/22 20:20    23  176/80   88


 


 06/20/22 20:10    13  176/80   86


 


 06/20/22 20:00    25 H  176/80   93


 


 06/20/22 19:50    14  176/80  


 


 06/20/22 19:40    16  176/80  


 


 06/20/22 19:30    11 L  176/80   85


 


 06/20/22 19:20    20  176/80   81 L


 


 06/20/22 19:15   77  15   176/88  99


 


 06/20/22 19:10    22  176/80   84


 


 06/20/22 19:00    18  176/80  


 


 06/20/22 18:50    16  176/80  


 


 06/20/22 18:40    25 H  176/80  


 


 06/20/22 18:30    11 L  176/80   98


 


 06/20/22 18:20    19  176/80   98


 


 06/20/22 18:10    17  176/80   97


 


 06/20/22 18:00    21  176/80   99


 


 06/20/22 17:50    15  176/80   100


 


 06/20/22 17:40    20  176/80   97


 


 06/20/22 17:30    14  176/80   96


 


 06/20/22 17:22    15  176/80   97


 


 06/20/22 17:16    20  176/80   98


 


 06/20/22 17:10     176/80   92


 


 06/20/22 16:41   78  16   153/75  100


 


 06/20/22 16:20   85  16   


 


 06/20/22 16:16   82  17   


 


 06/20/22 16:00   80  15  176/80  


 


 06/20/22 15:46   78  22  176/80  


 


 06/20/22 15:30   94 H  16  176/80  


 


 06/20/22 15:16   80  13  176/80  


 


 06/20/22 15:14   79  16   176/80  98


 


 06/20/22 15:00   79  17  175/86   99


 


 06/20/22 14:46   84  19  177/110   97


 


 06/20/22 14:30   77  18  165/77   96


 


 06/20/22 14:24   70  15   165/77  98


 


 06/20/22 14:16     166/103   98


 


 06/20/22 14:00   86  15  160/77   98


 


 06/20/22 13:46   68  20  154/66   98


 


 06/20/22 13:30   63  17  153/75   97


 


 06/20/22 13:16   63  13  156/79   98


 


 06/20/22 13:10   77  10 L   156/79  98


 


 06/20/22 13:00   67  24  160/89   97


 


 06/20/22 12:46   73  15  144/68   97


 


 06/20/22 12:30   69  20  159/71   98


 


 06/20/22 12:25   61   159/71  


 


 06/20/22 12:17   77  12   134/60  98


 


 06/20/22 12:15   77  17  134/60   97


 


 06/20/22 12:14       99


 


 06/20/22 12:00   75  13    98


 


 06/20/22 11:46   74  11 L    97


 


 06/20/22 11:33    11 L   


 


 06/20/22 11:26  97.6 F  63  16  160/65   97














- Physical Examination


General: No Apparent Distress


HEENT: Positive: PERRL


Cardiac: Positive: irregularly irregular


Lungs: Positive: Normal Breath Sounds


Neuro: Positive: Grossly Intact


Abdomen: Positive: Soft


Skin: Negative: Rash, Suspicious Lesions, Ulceration


Extremities: Present: upper extr. pulses, edema (Right lower extremity greater 

than left lower extremity)





- Labs and Meds


                                 Cardiac Enzymes











  06/20/22 Range/Units





  12:46 


 


CK-MB (CK-2)  1.5  (0.0-4.0)  ng/mL








                                   Coagulation











  06/20/22 06/20/22 Range/Units





  12:46 20:05 


 


PT  18.9 H  14.4  (12.2-14.9)  Sec.


 


INR  1.41 H  1.01  (0.87-1.13)  


 


APTT  49.5 H  31.0  (24.2-36.6)  Sec.








                                       CBC











  06/20/22 06/20/22 Range/Units





  12:46 17:08 


 


WBC  6.2  7.6  (4.5-11.0)  K/mm3


 


RBC  4.83  5.38 H  (3.65-5.03)  M/mm3


 


Hgb  14.1  15.5 H  (10.1-14.3)  gm/dl


 


Hct  42.4  48.2 H  (30.3-42.9)  %


 


Plt Count  199  198  (140-440)  K/mm3


 


Lymph # (Auto)  1.3   (1.2-5.4)  K/mm3


 


Mono # (Auto)  0.5   (0.0-0.8)  K/mm3


 


Eos # (Auto)  0.2   (0.0-0.4)  K/mm3


 


Baso # (Auto)  0.0   (0.0-0.1)  K/mm3








                          Comprehensive Metabolic Panel











  06/20/22 06/20/22 06/21/22 Range/Units





  12:46 17:08 05:07 


 


Sodium  138   140  (137-145)  mmol/L


 


Potassium  4.4   3.7  (3.6-5.0)  mmol/L


 


Chloride  102.1   96.3 L  ()  mmol/L


 


Carbon Dioxide  26   29  (22-30)  mmol/L


 


BUN  13   17  (7-17)  mg/dL


 


Creatinine  0.8  0.7  1.0  (0.6-1.2)  mg/dL


 


Glucose  86   208 H  ()  mg/dL


 


Calcium  9.5   9.5  (8.4-10.2)  mg/dL














- Imaging and Cardiology


EKG: report reviewed, image reviewed


Echo: report reviewed





- Telemetry


EKG Rhythm: Atrial Fibrillation





- EKG


Supraventricular dysrhythmia: atrial fibrillation


AV and intraventricular conduction: 1 AV block


Repolarization changes or abnormalities: nonspecific abnormality, ST segment, 

and/or T wave

## 2022-06-21 NOTE — DISCHARGE SUMMARY
Providers





- Providers


Date of Admission: 


06/20/22 16:22





Date of discharge: 06/21/22


Attending physician: 


GRECIA PASTOR MD





                                        





06/20/22 16:05


Consult to Physician [CONS] Routine 


   Comment: 


   Consulting Provider: CYDNEY STUART


   Physician Instructions: 


   Reason For Exam: chest pain











Primary care physician: 


RICARDO MOORE








Hospitalization


Reason for admission: CHF exacerbation


Condition: Fair


Hospital course: 





Patient is 86-year-old female with history of coronary artery disease status 

post stent placement, atrial fibrillation on therapeutic anticoagulation with 

Eliquis, hypertension and diabetes who presented with chest pain and shortness 

of breath.  He was admitted for ACS rule out and cardiology was consulted.  Her 

symptoms improved with Lasix.  Stress test was ordered and patient refused.  

Lower extremity Doppler was negative for DVT bilaterally.  Patient was 

discharged with instructions to follow-up with her primary cardiologist.


Disposition: 01 HOME / SELF CARE / HOMELESS


Final Discharge Diagnosis (Prints w/discharge instructions): Acute on chronic 

heart failure with preserved ejection fraction.  Atrial fibrillation.  

Hypertension.  Coronary artery disease status post PCI.  Hyperlipidemia.  

History of TIA.  Type 2 diabetes


Time spent for discharge: 30 minutes





Core Measure Documentation





- Palliative Care


Palliative Care/ Comfort Measures: Not Applicable





- Core Measures


Any of the following diagnoses?: heart failure





- Heart Failure Discharge Requirements


ACE/ARB for LVSD if EF <40%: Not Applicable


Beta blocker at discharge: Yes





Exam





- Physical Exam


Narrative exam: 





GENERAL: Well-developed well-nourished. Sitting on the side of the bed in no 

acute distress.


HEENT: Normocephalic. Atraumatic. 


NECK: Supple.  


CHEST/LUNGS: CTAB on room air


HEART/CARDIOVASCULAR: RRR. No murmur, rubs or gallops appreciated.


ABDOMEN: +BS. NT/ND.


SKIN: No rashes noted.


NEURO:  No focal motor deficit.  Follows all commands and is ambulatory.


MUSCULOSKELETAL: No joint effusion 


EXTREMITIES: No cyanosis, cubbing or edema.


PSYCH: Cooperative.





- Constitutional


Vitals: 


                                        











Temp Pulse Resp BP Pulse Ox


 


 97.8 F   82   18   143/62   95 


 


 06/21/22 09:13  06/21/22 09:20  06/21/22 09:13  06/21/22 09:20  06/21/22 09:25














Plan


Care Plan Goals: 


Please make sure to follow-up with your cardiologist outpatient.  Please resume 

taking all your medications.  We have sent refills to your preferred pharmacy.


Follow up with: 


RICARDO MOORE MD [Primary Care Provider] - 3-5 Days


Prescriptions: 


AtorvaSTATin 10 mg PO QHS 30 Days #30 tablet


Aspirin [Aspirin BABY CHEW TAB] 81 mg PO DAILY 30 Days #30 tab.chew


ISOSORBIDE MONOnitrate [Imdur ER] 60 mg PO QDAY 30 Days #30 tab


Furosemide [Lasix TAB] 40 mg PO QDAY 30 Days #30 tablet


Metoprolol Xl [Metoprolol SUCCINATE ER TAB] 50 mg PO QDAY 30 Days #30 tablet

## 2022-08-16 NOTE — EMERGENCY DEPARTMENT REPORT
HPI





- General


Chief Complaint: Chest Pain


Time Seen by Provider: 22 11:52





- HPI


HPI: 





Room 20











Patient is an 86-year-old female present with chief complaint of chest pain.  

Patient states she was awakened this morning at 04: 00 with substernal chest 

pressure and shortness of breath.  Patient states she developed numbness in her 

left upper extremity.  Patient admits to nausea without vomiting and diaphoresis

with her chest pressure.  Patient states the chest pressure has been 

intermittent since the onset and she currently gives it a score of 3/10.  

Patient has a history of coronary artery disease and has 10 cardiac stents in 

place and most recent which was placed 6 years ago.





ED Past Medical Hx





- Past Medical History


Previous Medical History?: Yes


Hx Hypertension: Yes


Hx CVA: Yes


Hx Heart Attack/AMI: Yes


Hx Diabetes: Yes


Additional medical history: Atrial fibrillation





- Surgical History


Past Surgical History?: Yes


Hx Coronary Stent: Yes (10)





- Family History


Family history: no significant





- Social History


Smoking Status: Former Smoker (None since )


Substance Use Type: None





- Medications


Home Medications: 


                                Home Medications











 Medication  Instructions  Recorded  Confirmed  Last Taken  Type


 


ISOSORBIDE MONOnitrate [Imdur ER] 60 mg PO QDAY 18 

History


 


Insulin NPH/Regular [NovoLIN 70/30] 12 unit SUB-Q BIDDIAB  units 18  

Unknown Rx


 


Apixaban [Eliquis] 5 mg PO BID 22 History


 


Humalog 25 unit SUB-Q DAILY 22 Unknown History


 


Lasix 40 mg PO DAILY 22 History


 


Metoprolol 1,000 mg PO BID 22 History


 


metFORMIN 1,000 mg PO BID 22 History














ED Review of Systems


ROS: 


Stated complaint: CHEST PAIN


Other details as noted in HPI





Constitutional: diaphoresis


Eyes: denies: eye pain


ENT: denies: throat pain


Respiratory: shortness of breath


Cardiovascular: chest pain


Endocrine: no symptoms reported


Gastrointestinal: nausea.  denies: vomiting


Genitourinary: denies: dysuria


Musculoskeletal: denies: back pain


Skin: denies: lesions


Neurological: paresthesias.  denies: headache





Physical Exam





- Physical Exam


Vital Signs: 


                                   Vital Signs











  22





  11:26


 


Temperature 97.6 F


 


Pulse Rate 63


 


Respiratory 16





Rate 


 


Blood Pressure 160/65


 


O2 Sat by Pulse 97





Oximetry 











Physical Exam: 





GENERAL: The patient is well-developed well-nourished female lying on stretcher 

not appearing to be in acute distress. []


HEENT: Normocephalic.  Atraumatic.  Extraocular motions are intact.  Patient has

 moist mucous membranes.


NECK: Supple.  Trachea midline


CHEST/LUNGS: Clear to auscultation.  There is no respiratory distress noted.


HEART/CARDIOVASCULAR: Regular.  There is no tachycardia.  There is no gallop rub

 or murmur.


ABDOMEN: Abdomen is soft, nontender.  Patient has normal bowel sounds.  There is

 no abdominal distention.


SKIN: There is no rash.  There is no edema.  There is no diaphoresis.


NEURO: The patient is awake, alert, and oriented.  The patient is cooperative.  

The patient has no focal neurologic deficits.  The patient has normal speech.  

GCS 15


MUSCULOSKELETAL:  There is no evidence of acute injury.





ED Course


                                   Vital Signs











  22





  11:26


 


Temperature 97.6 F


 


Pulse Rate 63


 


Respiratory 16





Rate 


 


Blood Pressure 160/65


 


O2 Sat by Pulse 97





Oximetry 














- Consultations


Consultation #1: 





22 15:00


Case discussed with cardiology micrometers-will evaluate patient





ED Medical Decision Making





- Lab Data


Result diagrams: 


                                 22 12:46





                                 22 12:46





                                Laboratory Tests











  22





  12:46 12:46 12:46


 


WBC  6.2  


 


RBC  4.83  


 


Hgb  14.1  


 


Hct  42.4  


 


MCV  88  


 


MCH  29  


 


MCHC  33  


 


RDW  15.1  


 


Plt Count  199  


 


Lymph % (Auto)  21.4  


 


Mono % (Auto)  8.5 H  


 


Eos % (Auto)  2.6  


 


Baso % (Auto)  0.5  


 


Lymph # (Auto)  1.3  


 


Mono # (Auto)  0.5  


 


Eos # (Auto)  0.2  


 


Baso # (Auto)  0.0  


 


Seg Neutrophils %  67.0  


 


Seg Neutrophils #  4.1  


 


PT   18.9 H 


 


INR   1.41 H 


 


APTT   49.5 H 


 


VBG pH   


 


Sodium    138


 


Potassium    4.4


 


Chloride    102.1


 


Carbon Dioxide    26


 


Anion Gap    14


 


BUN    13


 


Creatinine    0.8


 


Estimated GFR    > 60


 


BUN/Creatinine Ratio    16


 


Glucose    86


 


Calcium    9.5


 


Total Creatine Kinase    52


 


CK-MB (CK-2)    1.5


 


CK-MB (CK-2) Rel Index    2.8


 


Troponin T    < 0.010














  22





  12:46


 


WBC 


 


RBC 


 


Hgb 


 


Hct 


 


MCV 


 


MCH 


 


MCHC 


 


RDW 


 


Plt Count 


 


Lymph % (Auto) 


 


Mono % (Auto) 


 


Eos % (Auto) 


 


Baso % (Auto) 


 


Lymph # (Auto) 


 


Mono # (Auto) 


 


Eos # (Auto) 


 


Baso # (Auto) 


 


Seg Neutrophils % 


 


Seg Neutrophils # 


 


PT 


 


INR 


 


APTT 


 


VBG pH  7.317 L


 


Sodium 


 


Potassium 


 


Chloride 


 


Carbon Dioxide 


 


Anion Gap 


 


BUN 


 


Creatinine 


 


Estimated GFR 


 


BUN/Creatinine Ratio 


 


Glucose 


 


Calcium 


 


Total Creatine Kinase 


 


CK-MB (CK-2) 


 


CK-MB (CK-2) Rel Index 


 


Troponin T 














- EKG Data


-: EKG Interpreted by Me


Rate: normal (68 bpm)





- EKG Data


When compared to previous EKG there are: previous EKG unavailable


Interpretation: other (Atrial fibrillation at 68 bpm)





- Radiology Data


Radiology results: report reviewed (Chest x-ray), image reviewed (Chest x-ray)


interpreted by me: 





Chest x-ray- left lower lobe haziness, no pneumothorax.  [facility]  


                                     [address] [place] [address]  


 


                                            XRay Report   


                                               Signed  


 


Patient: JONAH LEWIS                                                  

              MR#: [id number]  


[id number]          


: 1935                                                                

Acct:[id number]      


 


Age/Sex: 86 / F                                                                

ADM Date: 22     


 


Loc: ED       


Attending Dr:   


 


 


Ordering Physician: ISAEL JIN MD  


Date of Service: 22  


Procedure(s): XR chest 1V ap  


Accession Number(s): [id number]  


 


cc: ISAEL JIN MD   


 


Fluoro Time In Minutes:   


 


XR chest 1V ap  


 


 INDICATION / CLINICAL INFORMATION: chest pain.  


 


 COMPARISON: None available.  


 


 FINDINGS:  


 


 SUPPORT DEVICES: None.  


 HEART /PULMONARY VASCULATURE: Cardiac enlargement with pulmonary vasculature 

congestion.   


 LUNGS / PLEURA: Mild increased interstitial markings in the lung bases. No 

focal airspace 


consolidation. No sizable pleural effusion or pneumothorax.   


 


 ADDITIONAL FINDINGS: No significant additional findings.  


 


 IMPRESSION:  


 1. Findings indicative of CHF/volume overload with mild interstitial edema.  


 


 Signer Name: Kyra Carias MD   


 Signed: 2022 1:36 PM  


 Workstation Name: [employer]   


 


 


Transcribed By: MARION  


Dictated By: KYRA CARIAS MD  


Electronically Authenticated By: KYRA CARIAS MD    


Signed Date/Time: 22                                


 


 


 


DD/DT: 22                                                            

  


TD/TT:





- Differential Diagnosis


ACS, pericarditis, GERD


Critical care attestation.: 


If time is entered above; I have spent that time in minutes in the direct care 

of this critically ill patient, excluding procedure time.








ED Disposition


Clinical Impression: 


 Chest pain, CHF exacerbation





Disposition:  ADMITTED AS INPATIENT


Is pt being admited?: Yes


Does the pt Need Aspirin: No


Condition: Fair


Instructions:  Nonspecific Chest Pain, Adult


Time of Disposition: 14:22 (Care transferred to hospitalist (Dr. Galeano))





Heart Score





- HEART Score


History: Moderately suspicious


EKG: Non-specific


Age: > 65


Risk factors: > 3 risk factors or hx of atherosclerotic disease


Troponin: < normal limit


HEART Score: 6





- EKG Read Time


Time EKG Completed: 14:14


EKG Read Time: 14:15 skill demonstration